# Patient Record
Sex: MALE
[De-identification: names, ages, dates, MRNs, and addresses within clinical notes are randomized per-mention and may not be internally consistent; named-entity substitution may affect disease eponyms.]

---

## 2017-07-14 PROBLEM — Z00.00 ENCOUNTER FOR PREVENTIVE HEALTH EXAMINATION: Status: ACTIVE | Noted: 2017-07-14

## 2017-08-02 PROBLEM — Z00.00 ENCOUNTER FOR PREVENTIVE HEALTH EXAMINATION: Status: ACTIVE | Noted: 2017-08-02

## 2017-09-19 ENCOUNTER — APPOINTMENT (OUTPATIENT)
Dept: GASTROENTEROLOGY | Facility: CLINIC | Age: 47
End: 2017-09-19

## 2018-04-04 ENCOUNTER — HOSPITAL ENCOUNTER (INPATIENT)
Dept: HOSPITAL 74 - YASAS | Age: 48
LOS: 4 days | Discharge: HOME | DRG: 897 | End: 2018-04-08
Attending: INTERNAL MEDICINE | Admitting: INTERNAL MEDICINE
Payer: MEDICARE

## 2018-04-04 VITALS — BODY MASS INDEX: 27.5 KG/M2

## 2018-04-04 DIAGNOSIS — F32.9: ICD-10-CM

## 2018-04-04 DIAGNOSIS — F14.10: ICD-10-CM

## 2018-04-04 DIAGNOSIS — Z99.89: ICD-10-CM

## 2018-04-04 DIAGNOSIS — M12.572: ICD-10-CM

## 2018-04-04 DIAGNOSIS — G47.00: ICD-10-CM

## 2018-04-04 DIAGNOSIS — K86.1: ICD-10-CM

## 2018-04-04 DIAGNOSIS — F10.230: Primary | ICD-10-CM

## 2018-04-04 DIAGNOSIS — F43.29: ICD-10-CM

## 2018-04-04 DIAGNOSIS — F41.9: ICD-10-CM

## 2018-04-04 DIAGNOSIS — F19.24: ICD-10-CM

## 2018-04-04 DIAGNOSIS — I10: ICD-10-CM

## 2018-04-04 DIAGNOSIS — G47.30: ICD-10-CM

## 2018-04-04 LAB
APPEARANCE UR: CLEAR
BILIRUB UR STRIP.AUTO-MCNC: NEGATIVE MG/DL
COLOR UR: YELLOW
KETONES UR QL STRIP: NEGATIVE
LEUKOCYTE ESTERASE UR QL STRIP.AUTO: NEGATIVE
NITRITE UR QL STRIP: NEGATIVE
PH UR: 5 [PH] (ref 5–8)
PROT UR QL STRIP: NEGATIVE
PROT UR QL STRIP: NEGATIVE
SP GR UR: 1.01 (ref 1–1.03)
UROBILINOGEN UR STRIP-MCNC: (no result) MG/DL (ref 0.2–1)

## 2018-04-04 PROCEDURE — G0008 ADMIN INFLUENZA VIRUS VAC: HCPCS

## 2018-04-04 PROCEDURE — HZ2ZZZZ DETOXIFICATION SERVICES FOR SUBSTANCE ABUSE TREATMENT: ICD-10-PCS | Performed by: INTERNAL MEDICINE

## 2018-04-04 RX ADMIN — CYCLOBENZAPRINE HYDROCHLORIDE SCH MG: 10 TABLET, FILM COATED ORAL at 22:28

## 2018-04-04 RX ADMIN — NAPROXEN SCH MG: 500 TABLET ORAL at 22:28

## 2018-04-04 RX ADMIN — Medication SCH MG: at 22:28

## 2018-04-04 RX ADMIN — PANTOPRAZOLE SODIUM SCH MG: 40 TABLET, DELAYED RELEASE ORAL at 17:58
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
## 2018-04-05 LAB
ALBUMIN SERPL-MCNC: 3.2 G/DL (ref 3.4–5)
ALP SERPL-CCNC: 102 U/L (ref 45–117)
ALT SERPL-CCNC: 11 U/L (ref 12–78)
ANION GAP SERPL CALC-SCNC: 10 MMOL/L (ref 8–16)
AST SERPL-CCNC: 13 U/L (ref 15–37)
BILIRUB SERPL-MCNC: 1.3 MG/DL (ref 0.2–1)
BUN SERPL-MCNC: 7 MG/DL (ref 7–18)
CALCIUM SERPL-MCNC: 8.5 MG/DL (ref 8.5–10.1)
CHLORIDE SERPL-SCNC: 105 MMOL/L (ref 98–107)
CO2 SERPL-SCNC: 28 MMOL/L (ref 21–32)
CREAT SERPL-MCNC: 0.7 MG/DL (ref 0.7–1.3)
DEPRECATED RDW RBC AUTO: 14.7 % (ref 11.9–15.9)
GLUCOSE SERPL-MCNC: 73 MG/DL (ref 74–106)
HCT VFR BLD CALC: 37.9 % (ref 35.4–49)
HGB BLD-MCNC: 12.6 GM/DL (ref 11.7–16.9)
MCH RBC QN AUTO: 31.3 PG (ref 25.7–33.7)
MCHC RBC AUTO-ENTMCNC: 33.2 G/DL (ref 32–35.9)
MCV RBC: 94.2 FL (ref 80–96)
PLATELET # BLD AUTO: 266 K/MM3 (ref 134–434)
PMV BLD: 7.4 FL (ref 7.5–11.1)
POTASSIUM SERPLBLD-SCNC: 3.9 MMOL/L (ref 3.5–5.1)
PROT SERPL-MCNC: 6.1 G/DL (ref 6.4–8.2)
RBC # BLD AUTO: 4.02 M/MM3 (ref 4–5.6)
SODIUM SERPL-SCNC: 143 MMOL/L (ref 136–145)
WBC # BLD AUTO: 3.8 K/MM3 (ref 4–10)

## 2018-04-05 RX ADMIN — PANTOPRAZOLE SODIUM SCH MG: 40 TABLET, DELAYED RELEASE ORAL at 10:38

## 2018-04-05 RX ADMIN — PANCRELIPASE SCH: 36000; 180000; 114000 CAPSULE, DELAYED RELEASE PELLETS ORAL at 13:57

## 2018-04-05 RX ADMIN — CYCLOBENZAPRINE HYDROCHLORIDE SCH MG: 10 TABLET, FILM COATED ORAL at 22:22

## 2018-04-05 RX ADMIN — Medication SCH DROP: at 22:22

## 2018-04-05 RX ADMIN — Medication SCH MG: at 22:22

## 2018-04-05 RX ADMIN — NAPROXEN SCH MG: 500 TABLET ORAL at 22:22

## 2018-04-05 RX ADMIN — CYCLOBENZAPRINE HYDROCHLORIDE SCH: 10 TABLET, FILM COATED ORAL at 06:15

## 2018-04-05 RX ADMIN — PANCRELIPASE SCH CAP: 36000; 180000; 114000 CAPSULE, DELAYED RELEASE PELLETS ORAL at 10:38

## 2018-04-05 RX ADMIN — NAPROXEN SCH MG: 500 TABLET ORAL at 10:38

## 2018-04-05 RX ADMIN — PANCRELIPASE SCH CAP: 36000; 180000; 114000 CAPSULE, DELAYED RELEASE PELLETS ORAL at 17:38

## 2018-04-05 RX ADMIN — AMLODIPINE BESYLATE SCH MG: 10 TABLET ORAL at 10:38

## 2018-04-05 RX ADMIN — Medication SCH TAB: at 10:38

## 2018-04-05 NOTE — PN
S CIWA





- CIWA Score


Nausea/Vomitin-No Nausea/No Vomiting


Muscle Tremors: 3


Anxiety: 5


Agitation: 4-Moderately Restless


Paroxysmal Sweats: 3


Orientation: 0-Oriented


Tacttile Disturbances: 3-Moderate Itch/Numb/Burn


Auditory Disturbances: 0-None


Visual Disturbances: 0-None


Headache: 0-None Present


CIWA-Ar Total Score: 18





BHS Progress Note (SOAP)


Subjective: 





Anxious, Body Aches, Sweating, Interrupted Sleep.


Objective: 


PATIENT A & O X 3, OBSERVED AMBULATING ON UNIT. NO ACUTE DISTRESS.





18 17:28


 Vital Signs











Temperature  98.0 F   18 14:04


 


Pulse Rate  80   18 14:04


 


Respiratory Rate  18   18 14:04


 


Blood Pressure  120/62   18 14:04


 


O2 Sat by Pulse Oximetry (%)      








 Laboratory Tests











  18





  22:30 07:00 07:00


 


WBC   3.8 L 


 


RBC   4.02 


 


Hgb   12.6 


 


Hct   37.9 


 


MCV   94.2 


 


MCH   31.3 


 


MCHC   33.2 


 


RDW   14.7 


 


Plt Count   266 


 


MPV   7.4 L 


 


Sodium    143


 


Potassium    3.9


 


Chloride    105


 


Carbon Dioxide    28


 


Anion Gap    10


 


BUN    7


 


Creatinine    0.7


 


Creat Clearance w eGFR    > 60


 


Random Glucose    73 L


 


Calcium    8.5


 


Total Bilirubin    1.3 H


 


AST    13 L


 


ALT    11 L


 


Alkaline Phosphatase    102


 


Total Protein    6.1 L


 


Albumin    3.2 L


 


Urine Color  Yellow  


 


Urine Appearance  Clear  


 


Urine pH  5.0  


 


Ur Specific [place]  1.011  


 


Urine Protein  Negative  


 


Urine Glucose (UA)  Negative  


 


Urine Ketones  Negative  


 


Urine Blood  Negative  


 


Urine Nitrite  Negative  


 


Urine Bilirubin  Negative  


 


Urine Urobilinogen  4.0 e.u/dl  


 


Ur Leukocyte Esterase  Negative  


 


RPR Titer   














  18





  07:00


 


WBC 


 


RBC 


 


Hgb 


 


Hct 


 


MCV 


 


MCH 


 


MCHC 


 


RDW 


 


Plt Count 


 


MPV 


 


Sodium 


 


Potassium 


 


Chloride 


 


Carbon Dioxide 


 


Anion Gap 


 


BUN 


 


Creatinine 


 


Creat Clearance w eGFR 


 


Random Glucose 


 


Calcium 


 


Total Bilirubin 


 


AST 


 


ALT 


 


Alkaline Phosphatase 


 


Total Protein 


 


Albumin 


 


Urine Color 


 


Urine Appearance 


 


Urine pH 


 


Ur Specific Gravity 


 


Urine Protein 


 


Urine Glucose (UA) 


 


Urine Ketones 


 


Urine Blood 


 


Urine Nitrite 


 


Urine Bilirubin 


 


Urine Urobilinogen 


 


Ur Leukocyte Esterase 


 


RPR Titer  Nonreactive








LABS NOTED.


Assessment: 





18 17:29


WITHDRAWAL SYMPTOMS.


Plan: 





CONTINUE DETOX.


INCREASE DAILY PO FLUID INTAKE.

## 2018-04-05 NOTE — CONSULT
[facility] Psychiatric Consult





- Data


Date of interview: 04/05/18


Admission source: [facility]


Identifying data: Pt. is a 47 year old  male, , on 

disability (receiving SSD) and currently homeless. This is patient's first 

admission to [facility].  Pt. admitted to  for alcohol and cocaine dependence.


Substance Abuse History: Smoking Cessation.  Smoking history: Never smoked.  

Aproximately how many cigarettes per day: 0.  Hx Chewing Tobacco Use: No.  

Initiated information on smoking cessation: No.  'Breaking Loose' booklet given

: 04/04/18.  - Substance & Tx. History.  Hx Alcohol Use: Yes (6 years ago 

[facility]).  Hx Substance Use: No.  Substance Use Type: Alcohol, Cocaine.  Hx 

Substance Use Treatment: Yes ([facility]).  - Substances Abused.  ** Alcohol.  

Route: Oral.  Frequency: Daily.  Amount used: 8 beers.  Age of first use: 19.  

Date of Last Use: 04/03/18.  ** Cocaine.  Route: Smoking.  Frequency: 1-2 times 

per week.  Amount used: 1 gram.  Age of first use: 19.  Date of Last Use: 04/03/ 18


Medical History: Hypertension, Left ankle surgery


Psychiatric History: Pt. reports seeing an outpatient psychiatrist one year ago 

and was started on zoloft. Pt. reports nonadherence to medications and 

outpatient treatment. Pt. denies h/o suicide attempts. Pt currently denies 

suicidal and homicidal ideation.


Physical/Sexual Abuse/Trauma History: Denies.





Mental Status Exam





- Mental Status Exam


Alert and Oriented to: Time, Place, Person


Cognitive Function: Fair


Patient Appearance: Unkempt


Mood: Withdrawn


Patient Behavior: Sedated (Pt. able to be awaken to complete interview. ), 

Fatigued, Guarded, Asleep


Speech Pattern: Delayed


Voice Loudness: Moderately Soft/Quiet


Thought Process: Goal Oriented


Thought Disorder: Not Present


Hallucinations: Denies


Suicidal Ideation: Denies


Homicidal Ideation: Denies


Insight/Judgement: Poor


Sleep: Fair


Appetite: Fair


Muscle strength/Tone: Normal


Gait/Station: Normal





Psychiatric Findings





- Problem List (Axis 1, 2,3)


(1) Substance induced mood disorder


Current Visit: Yes   Status: Suspected   





(2) Alcohol dependence with uncomplicated withdrawal


Current Visit: Yes   Status: Acute   





(3) Cocaine abuse


Current Visit: Yes   Status: Acute   





- Initial Treatment Plan


Initial Treatment Plan: Psychoeducation provided. Detoxification in progress. 

Observation.

## 2018-04-05 NOTE — EKG
Test Reason : 

Blood Pressure : ***/*** mmHG

Vent. Rate : 084 BPM     Atrial Rate : 084 BPM

   P-R Int : 126 ms          QRS Dur : 082 ms

    QT Int : 372 ms       P-R-T Axes : 062 071 063 degrees

   QTc Int : 439 ms

 

NORMAL SINUS RHYTHM WITH SINUS ARRHYTHMIA

NORMAL ECG

NO PREVIOUS ECGS AVAILABLE

Confirmed by JHON CROSS, IQRA ([id number]) on 4/5/2018 11:09:55 AM

 

Referred By:             Confirmed By:IQRA FERREIRA MD

## 2018-04-06 RX ADMIN — Medication SCH APPFUL: at 15:26

## 2018-04-06 RX ADMIN — PANCRELIPASE SCH CAP: 36000; 180000; 114000 CAPSULE, DELAYED RELEASE PELLETS ORAL at 17:20

## 2018-04-06 RX ADMIN — Medication SCH DROP: at 10:49

## 2018-04-06 RX ADMIN — Medication SCH DROP: at 22:17

## 2018-04-06 RX ADMIN — BACLOFEN SCH MG: 10 TABLET ORAL at 15:30

## 2018-04-06 RX ADMIN — NAPROXEN SCH MG: 500 TABLET ORAL at 22:16

## 2018-04-06 RX ADMIN — AMLODIPINE BESYLATE SCH MG: 10 TABLET ORAL at 10:49

## 2018-04-06 RX ADMIN — LIDOCAINE SCH APPLIC: 5 OINTMENT TOPICAL at 15:26

## 2018-04-06 RX ADMIN — PANTOPRAZOLE SODIUM SCH MG: 40 TABLET, DELAYED RELEASE ORAL at 10:49

## 2018-04-06 RX ADMIN — Medication SCH TAB: at 10:49

## 2018-04-06 RX ADMIN — BACLOFEN SCH MG: 10 TABLET ORAL at 22:17

## 2018-04-06 RX ADMIN — NAPROXEN SCH MG: 500 TABLET ORAL at 10:49

## 2018-04-06 RX ADMIN — PANCRELIPASE SCH: 36000; 180000; 114000 CAPSULE, DELAYED RELEASE PELLETS ORAL at 12:30

## 2018-04-06 RX ADMIN — CYCLOBENZAPRINE HYDROCHLORIDE SCH: 10 TABLET, FILM COATED ORAL at 14:01

## 2018-04-06 RX ADMIN — Medication SCH MG: at 22:16

## 2018-04-06 RX ADMIN — PANCRELIPASE SCH CAP: 36000; 180000; 114000 CAPSULE, DELAYED RELEASE PELLETS ORAL at 07:11

## 2018-04-06 RX ADMIN — LIDOCAINE SCH APPLIC: 5 OINTMENT TOPICAL at 22:17

## 2018-04-06 RX ADMIN — CYCLOBENZAPRINE HYDROCHLORIDE SCH MG: 10 TABLET, FILM COATED ORAL at 06:23
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
## 2018-04-07 RX ADMIN — Medication SCH: at 23:36

## 2018-04-07 RX ADMIN — NAPROXEN SCH: 500 TABLET ORAL at 23:36

## 2018-04-07 RX ADMIN — PANCRELIPASE SCH CAP: 36000; 180000; 114000 CAPSULE, DELAYED RELEASE PELLETS ORAL at 17:36

## 2018-04-07 RX ADMIN — LIDOCAINE SCH APPLIC: 5 OINTMENT TOPICAL at 10:49

## 2018-04-07 RX ADMIN — Medication SCH DROP: at 10:49

## 2018-04-07 RX ADMIN — Medication SCH TAB: at 10:49

## 2018-04-07 RX ADMIN — Medication SCH APPFUL: at 10:48

## 2018-04-07 RX ADMIN — BACLOFEN SCH: 10 TABLET ORAL at 23:36

## 2018-04-07 RX ADMIN — PANTOPRAZOLE SODIUM SCH MG: 40 TABLET, DELAYED RELEASE ORAL at 10:49

## 2018-04-07 RX ADMIN — PANCRELIPASE SCH CAP: 36000; 180000; 114000 CAPSULE, DELAYED RELEASE PELLETS ORAL at 07:55

## 2018-04-07 RX ADMIN — BACLOFEN SCH MG: 10 TABLET ORAL at 13:28

## 2018-04-07 RX ADMIN — AMLODIPINE BESYLATE SCH MG: 10 TABLET ORAL at 10:49

## 2018-04-07 RX ADMIN — PANCRELIPASE SCH CAP: 36000; 180000; 114000 CAPSULE, DELAYED RELEASE PELLETS ORAL at 11:24

## 2018-04-07 RX ADMIN — LIDOCAINE SCH: 5 OINTMENT TOPICAL at 23:36

## 2018-04-07 RX ADMIN — BACLOFEN SCH MG: 10 TABLET ORAL at 05:53

## 2018-04-07 RX ADMIN — NAPROXEN SCH MG: 500 TABLET ORAL at 10:49

## 2018-04-07 NOTE — PN
BHS Progress Note (SOAP)


Subjective: 





Fatigue, Stomach Cramping, Anxious.


Objective: 


PATIENT A & O X 3, OBSERVED AMBULATING ON UNIT. NO ACUTE DISTRESS.





04/07/18 14:34


 Vital Signs











Temperature  98.0 F   04/07/18 13:15


 


Pulse Rate  94 H  04/07/18 13:15


 


Respiratory Rate  18   04/07/18 13:15


 


Blood Pressure  115/70   04/07/18 13:15


 


O2 Sat by Pulse Oximetry (%)      








 Laboratory Tests











  04/04/18 04/05/18 04/05/18





  22:30 07:00 07:00


 


WBC   3.8 L 


 


RBC   4.02 


 


Hgb   12.6 


 


Hct   37.9 


 


MCV   94.2 


 


MCH   31.3 


 


MCHC   33.2 


 


RDW   14.7 


 


Plt Count   266 


 


MPV   7.4 L 


 


Sodium    143


 


Potassium    3.9


 


Chloride    105


 


Carbon Dioxide    28


 


Anion Gap    10


 


BUN    7


 


Creatinine    0.7


 


Creat Clearance w eGFR    > 60


 


Random Glucose    73 L


 


Calcium    8.5


 


Total Bilirubin    1.3 H


 


AST    13 L


 


ALT    11 L


 


Alkaline Phosphatase    102


 


Total Protein    6.1 L


 


Albumin    3.2 L


 


Urine Color  Yellow  


 


Urine Appearance  Clear  


 


Urine pH  5.0  


 


Ur Specific [place]  1.011  


 


Urine Protein  Negative  


 


Urine Glucose (UA)  Negative  


 


Urine Ketones  Negative  


 


Urine Blood  Negative  


 


Urine Nitrite  Negative  


 


Urine Bilirubin  Negative  


 


Urine Urobilinogen  4.0 e.u/dl  


 


Ur Leukocyte Esterase  Negative  


 


RPR Titer   














  04/05/18





  07:00


 


WBC 


 


RBC 


 


Hgb 


 


Hct 


 


MCV 


 


MCH 


 


MCHC 


 


RDW 


 


Plt Count 


 


MPV 


 


Sodium 


 


Potassium 


 


Chloride 


 


Carbon Dioxide 


 


Anion Gap 


 


BUN 


 


Creatinine 


 


Creat Clearance w eGFR 


 


Random Glucose 


 


Calcium 


 


Total Bilirubin 


 


AST 


 


ALT 


 


Alkaline Phosphatase 


 


Total Protein 


 


Albumin 


 


Urine Color 


 


Urine Appearance 


 


Urine pH 


 


Ur Specific Gravity 


 


Urine Protein 


 


Urine Glucose (UA) 


 


Urine Ketones 


 


Urine Blood 


 


Urine Nitrite 


 


Urine Bilirubin 


 


Urine Urobilinogen 


 


Ur Leukocyte Esterase 


 


RPR Titer  Nonreactive








LABS NOTED.


Assessment: 





04/07/18 14:35


WITHDRAWAL SYMPTOMS.


Plan: 





CONTINUE DETOX.


INCREASE DAILY PO FLUID INTAKE.

## 2018-04-08 VITALS — HEART RATE: 89 BPM | SYSTOLIC BLOOD PRESSURE: 109 MMHG | DIASTOLIC BLOOD PRESSURE: 64 MMHG | TEMPERATURE: 97.4 F

## 2018-04-08 RX ADMIN — PANCRELIPASE SCH CAP: 36000; 180000; 114000 CAPSULE, DELAYED RELEASE PELLETS ORAL at 07:34

## 2018-04-08 RX ADMIN — BACLOFEN SCH MG: 10 TABLET ORAL at 05:55

## 2018-04-08 RX ADMIN — BACLOFEN SCH: 10 TABLET ORAL at 05:52

## 2018-04-08 NOTE — DS
[facility] Detox Discharge Summary


Admission Date: 


04/04/18





Discharge Date: 04/08/18





- History


Present History: Alcohol Dependence


Pertinent Past History: 





HTN





Pancreatitis 





Obstructive sleep apnea





- Physical Exam Results


Vital Signs: 


 Vital Signs











Temperature  97.4 F L  04/08/18 09:20


 


Pulse Rate  89   04/08/18 09:20


 


Respiratory Rate  18   04/08/18 09:20


 


Blood Pressure  109/64   04/08/18 09:20


 


O2 Sat by Pulse Oximetry (%)      











Pertinent Admission Physical Exam Findings: 





Withdrawal symptoms





 Laboratory Tests











  04/04/18 04/05/18 04/05/18





  22:30 07:00 07:00


 


WBC   3.8 L 


 


RBC   4.02 


 


Hgb   12.6 


 


Hct   37.9 


 


MCV   94.2 


 


MCH   31.3 


 


MCHC   33.2 


 


RDW   14.7 


 


Plt Count   266 


 


MPV   7.4 L 


 


Sodium    143


 


Potassium    3.9


 


Chloride    105


 


Carbon Dioxide    28


 


Anion Gap    10


 


BUN    7


 


Creatinine    0.7


 


Creat Clearance w eGFR    > 60


 


Random Glucose    73 L


 


Calcium    8.5


 


Total Bilirubin    1.3 H


 


AST    13 L


 


ALT    11 L


 


Alkaline Phosphatase    102


 


Total Protein    6.1 L


 


Albumin    3.2 L


 


Urine Color  Yellow  


 


Urine Appearance  Clear  


 


Urine pH  5.0  


 


Ur Specific [place]  1.011  


 


Urine Protein  Negative  


 


Urine Glucose (UA)  Negative  


 


Urine Ketones  Negative  


 


Urine Blood  Negative  


 


Urine Nitrite  Negative  


 


Urine Bilirubin  Negative  


 


Urine Urobilinogen  4.0 e.u/dl  


 


Ur Leukocyte Esterase  Negative  


 


RPR Titer   














  04/05/18





  07:00


 


WBC 


 


RBC 


 


Hgb 


 


Hct 


 


MCV 


 


MCH 


 


MCHC 


 


RDW 


 


Plt Count 


 


MPV 


 


Sodium 


 


Potassium 


 


Chloride 


 


Carbon Dioxide 


 


Anion Gap 


 


BUN 


 


Creatinine 


 


Creat Clearance w eGFR 


 


Random Glucose 


 


Calcium 


 


Total Bilirubin 


 


AST 


 


ALT 


 


Alkaline Phosphatase 


 


Total Protein 


 


Albumin 


 


Urine Color 


 


Urine Appearance 


 


Urine pH 


 


Ur Specific Gravity 


 


Urine Protein 


 


Urine Glucose (UA) 


 


Urine Ketones 


 


Urine Blood 


 


Urine Nitrite 


 


Urine Bilirubin 


 


Urine Urobilinogen 


 


Ur Leukocyte Esterase 


 


RPR Titer  Nonreactive








Labs noted





- Treatment


Hospital Course: Detox Protocol Followed, Detoxed Safely, Responded well, 

Discharged Condition Good





- Medication


Discharge Medications: 


Ambulatory Orders





Amlodipine Besylate [Norvasc -] 10 mg PO DAILY 04/04/18 


Lipase/Protease/Amylase [Zenpep Dr 40,000 Unit Capsule] 40,000 units PO DAILY 04 /04/18 


Trimethoprim 1 drop OD BID 04/05/18 


Baclofen 10 mg PO TID 04/06/18 


Lidocaine 5% Top. Ointment [Xylocaine 5% Top. Ointment] 1 applic TP BID 04/06/ 18 











- Diagnosis


(1) Anxiety


Status: Chronic   





(2) Insomnia


Status: Chronic   





(3) Alcohol dependence with uncomplicated withdrawal


Status: Acute   





(4) Cocaine abuse


Status: Acute   





(5) Depression


Status: Chronic   


Qualifiers: 


   Depression Type: unspecified   Qualified Code(s): F32.9 - Major depressive 

disorder, single episode, unspecified   





(6) Pancreatitis


Status: Acute   


Qualifiers: 


   Chronicity: chronic   Pancreatitis type: unspecified pancreatitis type   

Qualified Code(s): K86.1 - Other chronic pancreatitis   





(7) Sleep apnea treated with nocturnal BiPAP


Status: Acute   





(8) HTN (hypertension)


Status: Chronic   


Qualifiers: 


   Hypertension type: unspecified   Qualified Code(s): I10 - Essential (primary

) hypertension   





(9) Substance induced mood disorder


Status: Acute   





- AMA


Did Patient Leave Against Medical Advice: No (Follow up with your PCP within 1-

2 weeks)

## 2018-09-27 PROBLEM — Z00.00 ENCOUNTER FOR PREVENTIVE HEALTH EXAMINATION: Status: ACTIVE | Noted: 2018-09-27

## 2018-11-01 ENCOUNTER — OTHER (OUTPATIENT)
Age: 48
End: 2018-11-01

## 2018-11-10 ENCOUNTER — APPOINTMENT (OUTPATIENT)
Dept: INTERNAL MEDICINE | Facility: CLINIC | Age: 48
End: 2018-11-10

## 2019-04-29 ENCOUNTER — APPOINTMENT (OUTPATIENT)
Dept: INTERNAL MEDICINE | Facility: CLINIC | Age: 49
End: 2019-04-29

## 2019-07-17 ENCOUNTER — APPOINTMENT (OUTPATIENT)
Dept: INTERNAL MEDICINE | Facility: CLINIC | Age: 49
End: 2019-07-17

## 2019-08-27 ENCOUNTER — APPOINTMENT (OUTPATIENT)
Dept: INTERNAL MEDICINE | Facility: CLINIC | Age: 49
End: 2019-08-27